# Patient Record
Sex: MALE | Race: WHITE
[De-identification: names, ages, dates, MRNs, and addresses within clinical notes are randomized per-mention and may not be internally consistent; named-entity substitution may affect disease eponyms.]

---

## 2020-09-02 ENCOUNTER — HOSPITAL ENCOUNTER (EMERGENCY)
Dept: HOSPITAL 74 - FER | Age: 30
Discharge: HOME | End: 2020-09-02
Payer: COMMERCIAL

## 2020-09-02 VITALS — HEART RATE: 71 BPM | TEMPERATURE: 98.7 F | SYSTOLIC BLOOD PRESSURE: 119 MMHG | DIASTOLIC BLOOD PRESSURE: 81 MMHG

## 2020-09-02 VITALS — BODY MASS INDEX: 27.9 KG/M2

## 2020-09-02 DIAGNOSIS — Z77.21: ICD-10-CM

## 2020-09-02 DIAGNOSIS — S63.614A: Primary | ICD-10-CM

## 2020-09-02 NOTE — PDOC
Post Exposure HPI





- General


Chief Complaint: Non EmpBld/Body Flud Exposure


Stated Complaint: BLOOD EXPOSURE TO RIGHT ARM AND RIGHT RINGFINGER I


Time Seen by Provider: 09/02/20 17:26


History Source: Patient


Exam Limitations: No Limitations





- History of Present Illness


Initial Comments: 





09/02/20 17:30


HPI


29 YOM with no sig medical history presenting with blood exposure to his skin. 

pt works as  for [employer] he was helping subdue a patient high 

on PCP. There was blood that got on his right forearm and right face, no breaks 

in the skin or wounds or injuries noted. pt cleaned off with water immediately. 

while subduing he also had right ring finger pain, worse with movement.


pt denies any other complaints.





 


Review of Systems 


MUSCULOSKELETAL: No joint pain and swelling. No muscle pain/arthralgias. +finger

pain.


Back: no back pain


SKIN: no redness or skin changes, no discharge, no rash. No wounds.


Hematologic: no easy bruising/bleeding. 


NEUROLOGIC: No weakness, numbness or tingling. 


Allergic/Immunologic: no allergies


All other systems reviewed and negative, or as documented in HPI. 








physical exam


General: NAD, well appearing


HEENT: NCAT, EOMI, PERRL. airway patent


Resp: no distress, speaking full sentences.


Vascular: 2+ DP and radial pulses symmetric and equal.


Back: no midline tenderness, no stepoffs, FROM


MSK: notable for soft compartments, Cap refill <2 sec. Proximal and distal 

strength 5/5,  strength 5/5 - equal and symmetric. FROM. Sensation grossly 

intact to light touch to median/radial/ulnar distribution. +right prox 

phalangeal ring finger TTP, but FROM, no swelling, no wounds and no deformity.


Neuro: alert, no focal neurologic deficits, gait stable.


Skin:  color normal color, warm and well perfused.  Cap refill <2 sec. no 

wounds, no lacerations or puncture wounds








09/02/20 17:33








Past History





- Medical History


Allergies/Adverse Reactions: 


                                    Allergies











Allergy/AdvReac Type Severity Reaction Status Date / Time


 


No Known Allergies Allergy   Unverified 09/02/20 17:18











Home Medications: 


Ambulatory Orders





Bupropion HCl [Wellbutrin Sr] 300 mg PO DAILY 09/02/20 








COPD: No


Other medical history: denies





- Immunization History


Immunization Up to Date: No





- Psycho-Social/Smoking History


Smoking History: Current every day smoker


Number of Cigarettes Smoked Daily: 15


Information on smoking cessation initiated: No





- Substance Abuse Hx (Audit-C & DAST Scrn)


How often the patient has a drink containing alcohol: 2-4 times / month


Number of drinks the patient has on a typical day: 1 or 2


Score: In Men: 4 or > Positive; In Women: 3 or > Positive: 2


Screen Result (Pos requires Nsg. Audit-10AR): Negative


In the last yr the pt used illegal drug/Rx for NonMed reason: No


Score:  Yes response is considered Positive: 0


Screen Result (Positive result requires Nsg. DAST-10): Negative





*Physical Exam





- Vital Signs


                                Last Vital Signs











Temp Pulse Resp BP Pulse Ox


 


 98.7 F   71   18   119/81   97 


 


 09/02/20 17:18  09/02/20 17:18  09/02/20 17:18  09/02/20 17:18  09/02/20 17:18














Medical Decision Making





- Medical Decision Making





09/02/20 17:32


Vital Signs











Temp Pulse Resp BP Pulse Ox


 


 98.7 F   71   18   119/81   97 


 


 09/02/20 17:18  09/02/20 17:18  09/02/20 17:18  09/02/20 17:18  09/02/20 17:18








vitals reviewed wnl.


reassuring





no wounds on secondary survey. no other complaints. no pain. no body 

injury/traumatic injuries


vaccines up to date


very low risk of exposure to blood borne pathogens such as hep b, c and hiv.


discussed with the patient, reassurance.





likely finger contusion, no xray imaging indicated, doubt fracture clinically 

and NVI





DC stable condition


09/02/20 17:33








Discharge





- Discharge Information


Problems reviewed: Yes


Clinical Impression/Diagnosis: 


 Exposure to blood or body fluid





Sprain of right ring finger


Qualifiers:


 Encounter type: initial encounter Sprain of finger site: unspecified site 

Qualified Code(s): S63.614A - Unspecified sprain of right ring finger, initial 

encounter





Condition: Stable


Disposition: HOME





- Admission


No





- Follow up/Referral


Referrals: 


Lelo Carlson MD [Primary Care Provider] - 





- Patient Discharge Instructions


Patient Printed Discharge Instructions:  DI for Finger Sprain, How to Handle 

Body Fluid Exposure -- Non-Healthcare Worker (At Home, Caregi





- Post Discharge Activity


Work/Back to School Note:  Back to Work

## 2021-02-23 ENCOUNTER — HOSPITAL ENCOUNTER (EMERGENCY)
Dept: HOSPITAL 74 - FER | Age: 31
Discharge: HOME | End: 2021-02-23
Payer: COMMERCIAL

## 2021-02-23 VITALS — DIASTOLIC BLOOD PRESSURE: 77 MMHG | SYSTOLIC BLOOD PRESSURE: 114 MMHG | TEMPERATURE: 98 F | HEART RATE: 72 BPM

## 2021-02-23 VITALS — BODY MASS INDEX: 28.7 KG/M2

## 2021-02-23 DIAGNOSIS — Z77.21: Primary | ICD-10-CM

## 2021-03-13 ENCOUNTER — HOSPITAL ENCOUNTER (EMERGENCY)
Dept: HOSPITAL 74 - FER | Age: 31
Discharge: HOME | End: 2021-03-13
Payer: COMMERCIAL

## 2021-03-13 VITALS — DIASTOLIC BLOOD PRESSURE: 84 MMHG | TEMPERATURE: 98.9 F | SYSTOLIC BLOOD PRESSURE: 121 MMHG | HEART RATE: 78 BPM

## 2021-03-13 VITALS — BODY MASS INDEX: 27.2 KG/M2

## 2021-03-13 DIAGNOSIS — H93.13: Primary | ICD-10-CM

## 2021-03-13 DIAGNOSIS — Z00.00: ICD-10-CM

## 2022-06-06 ENCOUNTER — HOSPITAL ENCOUNTER (EMERGENCY)
Dept: HOSPITAL 74 - FER | Age: 32
Discharge: HOME | End: 2022-06-06
Payer: COMMERCIAL

## 2022-06-06 VITALS — TEMPERATURE: 99 F | HEART RATE: 110 BPM | DIASTOLIC BLOOD PRESSURE: 56 MMHG | SYSTOLIC BLOOD PRESSURE: 118 MMHG

## 2022-06-06 VITALS — BODY MASS INDEX: 28.7 KG/M2

## 2022-06-06 DIAGNOSIS — S09.90XA: Primary | ICD-10-CM

## 2022-06-06 DIAGNOSIS — M54.2: ICD-10-CM
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
## 2022-12-27 ENCOUNTER — HOSPITAL ENCOUNTER (EMERGENCY)
Dept: HOSPITAL 74 - FER | Age: 32
Discharge: HOME | End: 2022-12-27
Payer: COMMERCIAL

## 2022-12-27 VITALS
SYSTOLIC BLOOD PRESSURE: 120 MMHG | RESPIRATION RATE: 18 BRPM | TEMPERATURE: 97.8 F | HEART RATE: 77 BPM | DIASTOLIC BLOOD PRESSURE: 80 MMHG

## 2022-12-27 VITALS — BODY MASS INDEX: 27.9 KG/M2
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
## 2022-12-27 DIAGNOSIS — S60.221A: Primary | ICD-10-CM

## 2022-12-27 DIAGNOSIS — M25.561: ICD-10-CM

## 2023-01-11 ENCOUNTER — HOSPITAL ENCOUNTER (EMERGENCY)
Dept: HOSPITAL 74 - FER | Age: 33
Discharge: HOME | End: 2023-01-11
Payer: COMMERCIAL

## 2023-01-11 VITALS
RESPIRATION RATE: 16 BRPM | HEART RATE: 87 BPM | TEMPERATURE: 99.6 F | SYSTOLIC BLOOD PRESSURE: 117 MMHG | DIASTOLIC BLOOD PRESSURE: 84 MMHG

## 2023-01-11 VITALS — BODY MASS INDEX: 27.9 KG/M2

## 2023-01-11 DIAGNOSIS — S69.92XA: Primary | ICD-10-CM
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
## 2023-01-13 ENCOUNTER — HOSPITAL ENCOUNTER (EMERGENCY)
Dept: HOSPITAL 74 - FER | Age: 33
Discharge: HOME | End: 2023-01-13
Payer: COMMERCIAL

## 2023-01-13 VITALS
SYSTOLIC BLOOD PRESSURE: 136 MMHG | RESPIRATION RATE: 18 BRPM | HEART RATE: 81 BPM | TEMPERATURE: 98.3 F | DIASTOLIC BLOOD PRESSURE: 71 MMHG

## 2023-01-13 VITALS — BODY MASS INDEX: 27.9 KG/M2
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
## 2023-01-13 DIAGNOSIS — S69.91XA: Primary | ICD-10-CM

## 2023-04-16 ENCOUNTER — HOSPITAL ENCOUNTER (EMERGENCY)
Dept: HOSPITAL 74 - FER | Age: 33
Discharge: HOME | End: 2023-04-16
Payer: COMMERCIAL

## 2023-04-16 VITALS
SYSTOLIC BLOOD PRESSURE: 127 MMHG | DIASTOLIC BLOOD PRESSURE: 67 MMHG | HEART RATE: 65 BPM | TEMPERATURE: 98.5 F | RESPIRATION RATE: 20 BRPM

## 2023-04-16 VITALS — BODY MASS INDEX: 27.9 KG/M2
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
## 2023-04-16 DIAGNOSIS — S50.02XA: Primary | ICD-10-CM
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
## 2023-05-14 ENCOUNTER — HOSPITAL ENCOUNTER (EMERGENCY)
Dept: HOSPITAL 74 - JER | Age: 33
Discharge: HOME | End: 2023-05-14
Payer: COMMERCIAL

## 2023-05-14 VITALS
TEMPERATURE: 97.7 F | DIASTOLIC BLOOD PRESSURE: 78 MMHG | RESPIRATION RATE: 18 BRPM | HEART RATE: 63 BPM | SYSTOLIC BLOOD PRESSURE: 122 MMHG

## 2023-05-14 VITALS — BODY MASS INDEX: 28.7 KG/M2

## 2023-05-14 DIAGNOSIS — Z77.21: Primary | ICD-10-CM

## 2023-05-14 LAB
ALBUMIN SERPL-MCNC: 3.8 G/DL (ref 3.4–5)
ALP SERPL-CCNC: 47 U/L (ref 45–117)
ALT SERPL-CCNC: 31 U/L (ref 13–61)
ANION GAP SERPL CALC-SCNC: 5 MMOL/L (ref 8–16)
AST SERPL-CCNC: 17 U/L (ref 15–37)
BASOPHILS # BLD: 0.3 % (ref 0–2)
BILIRUB SERPL-MCNC: 0.4 MG/DL (ref 0.2–1)
BUN SERPL-MCNC: 24.3 MG/DL (ref 7–18)
CALCIUM SERPL-MCNC: 9.3 MG/DL (ref 8.5–10.1)
CHLORIDE SERPL-SCNC: 109 MMOL/L (ref 98–107)
CO2 SERPL-SCNC: 28 MMOL/L (ref 21–32)
CREAT SERPL-MCNC: 0.9 MG/DL (ref 0.55–1.3)
DEPRECATED RDW RBC AUTO: 13.7 % (ref 11.9–15.9)
EOSINOPHIL # BLD: 3 % (ref 0–4.5)
GLUCOSE SERPL-MCNC: 108 MG/DL (ref 74–106)
HCT VFR BLD CALC: 39.8 % (ref 35.4–49)
HGB BLD-MCNC: 13.7 GM/DL (ref 11.7–16.9)
HIV 1+2 AB+HIV1 P24 AG SERPL QL IA: NEGATIVE
LYMPHOCYTES # BLD: 33.1 % (ref 8–40)
MCH RBC QN AUTO: 30.8 PG (ref 25.7–33.7)
MCHC RBC AUTO-ENTMCNC: 34.5 G/DL (ref 32–35.9)
MCV RBC: 89.3 FL (ref 80–96)
MONOCYTES # BLD AUTO: 10.3 % (ref 3.8–10.2)
NEUTROPHILS # BLD: 53.3 % (ref 42.8–82.8)
PLATELET # BLD AUTO: 163 10^3/UL (ref 134–434)
PMV BLD: 9.5 FL (ref 7.5–11.1)
POTASSIUM SERPLBLD-SCNC: 4 MMOL/L (ref 3.5–5.1)
PROT SERPL-MCNC: 6.8 G/DL (ref 6.4–8.2)
RBC # BLD AUTO: 4.45 M/MM3 (ref 4–5.6)
SODIUM SERPL-SCNC: 141 MMOL/L (ref 136–145)
WBC # BLD AUTO: 8.9 K/MM3 (ref 4–10)

## 2023-06-04 ENCOUNTER — HOSPITAL ENCOUNTER (EMERGENCY)
Dept: HOSPITAL 74 - FER | Age: 33
Discharge: HOME | End: 2023-06-04
Payer: COMMERCIAL

## 2023-06-04 VITALS
HEART RATE: 81 BPM | SYSTOLIC BLOOD PRESSURE: 142 MMHG | RESPIRATION RATE: 18 BRPM | DIASTOLIC BLOOD PRESSURE: 78 MMHG | TEMPERATURE: 98.6 F

## 2023-06-04 VITALS — BODY MASS INDEX: 28.7 KG/M2
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
## 2023-06-04 DIAGNOSIS — M54.2: Primary | ICD-10-CM

## 2023-06-04 DIAGNOSIS — S50.311A: ICD-10-CM

## 2023-06-04 DIAGNOSIS — S10.91XA: ICD-10-CM

## 2023-10-27 PROBLEM — Z00.00 ENCOUNTER FOR PREVENTIVE HEALTH EXAMINATION: Status: ACTIVE | Noted: 2023-10-27

## 2024-01-03 ENCOUNTER — APPOINTMENT (OUTPATIENT)
Dept: ENDOCRINOLOGY | Facility: CLINIC | Age: 34
End: 2024-01-03

## 2024-01-30 ENCOUNTER — APPOINTMENT (OUTPATIENT)
Dept: ORTHOPEDIC SURGERY | Facility: CLINIC | Age: 34
End: 2024-01-30
Payer: COMMERCIAL

## 2024-01-30 DIAGNOSIS — Z78.9 OTHER SPECIFIED HEALTH STATUS: ICD-10-CM

## 2024-01-30 PROCEDURE — 99204 OFFICE O/P NEW MOD 45 MIN: CPT

## 2024-01-30 RX ORDER — TRAMADOL HYDROCHLORIDE 50 MG/1
50 TABLET, COATED ORAL
Qty: 60 | Refills: 0 | Status: ACTIVE | COMMUNITY
Start: 2024-01-30 | End: 1900-01-01

## 2024-01-30 RX ORDER — DEXTROAMPHETAMINE SACCHARATE, AMPHETAMINE ASPARTATE, DEXTROAMPHETAMINE SULFATE, AND AMPHETAMINE SULFATE 5; 5; 5; 5 MG/1; MG/1; MG/1; MG/1
20 TABLET ORAL
Refills: 0 | Status: ACTIVE | COMMUNITY

## 2024-02-02 RX ORDER — BUSPIRONE HYDROCHLORIDE 10 MG/1
10 TABLET ORAL
Qty: 60 | Refills: 0 | Status: ACTIVE | COMMUNITY
Start: 2024-01-25

## 2024-02-02 RX ORDER — TIRZEPATIDE 2.5 MG/.5ML
2.5 INJECTION, SOLUTION SUBCUTANEOUS
Qty: 2 | Refills: 0 | Status: ACTIVE | COMMUNITY
Start: 2024-01-22

## 2024-02-02 RX ORDER — DEXTROAMPHETAMINE SACCHARATE, AMPHETAMINE ASPARTATE MONOHYDRATE, DEXTROAMPHETAMINE SULFATE AND AMPHETAMINE SULFATE 5; 5; 5; 5 MG/1; MG/1; MG/1; MG/1
20 CAPSULE, EXTENDED RELEASE ORAL
Qty: 20 | Refills: 0 | Status: ACTIVE | COMMUNITY
Start: 2024-01-25

## 2024-02-02 RX ORDER — HYDROXYZINE HYDROCHLORIDE 10 MG/1
10 TABLET ORAL
Qty: 60 | Refills: 0 | Status: ACTIVE | COMMUNITY
Start: 2024-01-25

## 2024-02-02 RX ORDER — DEXTROAMPHETAMINE SACCHARATE, AMPHETAMINE ASPARTATE, DEXTROAMPHETAMINE SULFATE AND AMPHETAMINE SULFATE 2.5; 2.5; 2.5; 2.5 MG/1; MG/1; MG/1; MG/1
10 TABLET ORAL
Qty: 28 | Refills: 0 | Status: ACTIVE | COMMUNITY
Start: 2024-01-25

## 2024-02-02 NOTE — PLAN
[TextEntry] : I recommended a [facility] home cervical traction unit, tramadol, and follow up in six weeks time.

## 2024-02-02 NOTE — HISTORY OF PRESENT ILLNESS
[de-identified] : Serge is here for follow up regarding his persistent, chronic neck pain. He has not garnered significant improvement with acupuncture medication nor physiotherapy. He has been getting improvement with chiropractic treatments.

## 2024-02-02 NOTE — END OF VISIT
[FreeTextEntry3] : I Julia Vega, acting as scribe, attest that this documentation has been prepared under the direction and in the presence of Provider Nickolas Orosco MD.   I was physically present during the service to the patient and/or personally examined the patient and I was directly involved in the management plan and recommendations of the care provided to the patient.   I Dr. Orosco, reviewed the history, the physical exam, and plan as documented by the PA. The documentation recorded by the PA, in my presence, accurately reflects the service I personally performed, and the decisions made by me with my edits as appropriate.  Nickolas Orosco MD

## 2024-03-12 ENCOUNTER — APPOINTMENT (OUTPATIENT)
Dept: ORTHOPEDIC SURGERY | Facility: CLINIC | Age: 34
End: 2024-03-12

## 2024-05-14 ENCOUNTER — APPOINTMENT (OUTPATIENT)
Dept: ORTHOPEDIC SURGERY | Facility: CLINIC | Age: 34
End: 2024-05-14
Payer: COMMERCIAL

## 2024-05-14 DIAGNOSIS — M54.2 CERVICALGIA: ICD-10-CM

## 2024-05-14 PROCEDURE — 99214 OFFICE O/P EST MOD 30 MIN: CPT

## 2024-05-15 PROBLEM — M54.2 NECK PAIN: Status: ACTIVE | Noted: 2024-01-30

## 2024-05-15 NOTE — PLAN
[TextEntry] : Repeat cervical MRI and follow-up with Dr. Orosco once the imaging is completed.  Call the office with any questions, concerns, or worsening symptoms. Plan was discussed with patient whom expressed understanding and agreed with plan.

## 2024-05-15 NOTE — ASSESSMENT
[FreeTextEntry1] : Patient presents for follow-up regarding neck pain. Pain is persistent. He reports only mild temporary relief with current treatment. He would like to know prognosis and next steps.

## 2024-05-15 NOTE — HISTORY OF PRESENT ILLNESS
[de-identified] : Serge Tucker presents for follow-up regarding his neck pain. He reports pain is 5/10 today, it's persistent. He reports minimal relief with tramadol and he does not want to continue taking it.  He reports relief with chiropractor, traction, and home stretching. He uses a manual traction device with mild relief. He has concerns regarding prognosis and next steps.

## 2024-05-15 NOTE — END OF VISIT
[FreeTextEntry3] :  Documented by Karen Lemon acting as a scribe for Dr. Nickolas Orosco.    All medical record entries made by the Scribe were at my, Dr. Nickolas Orosco. direction and personally dictated by me on . I have reviewed the chart and agree that the record accurately reflects my personal performance of the history, physical exam, assessment and plan. I have also personally directed, reviewed, and agreed with the chart.

## 2024-05-15 NOTE — PHYSICAL EXAM
[Normal] : Alert and in no acute distress [de-identified] : Gait is normal  Cervical Spine: Full ROM x 6 without pain. -ttp throughout. 5/5 throughout b/l upper extremities. SILT throughout.

## 2024-07-30 ENCOUNTER — APPOINTMENT (OUTPATIENT)
Dept: ORTHOPEDIC SURGERY | Facility: CLINIC | Age: 34
End: 2024-07-30

## 2024-07-30 VITALS — HEIGHT: 70 IN | BODY MASS INDEX: 27.2 KG/M2 | WEIGHT: 190 LBS

## 2024-07-30 DIAGNOSIS — M54.2 CERVICALGIA: ICD-10-CM

## 2024-07-30 PROCEDURE — 99212 OFFICE O/P EST SF 10 MIN: CPT

## 2024-07-31 NOTE — PHYSICAL EXAM
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 Consistent with his reported neck pain.

## 2024-07-31 NOTE — HISTORY OF PRESENT ILLNESS
[de-identified] : OTN REAL is a 33 year male presents with ongoing neck pain that remains unchanged. He recently underwent an MRI.

## 2024-07-31 NOTE — END OF VISIT
[FreeTextEntry3] : I, Medina Montana, acted as a scribe on behalf of Dr. Orosoc 07/30/2024.  All medical record entries made by Medina Montana (scribe) were at my, Dr. Nickolas Orosco, direction and personally dictated by me on 07/30/2024. I have reviewed the chart and agree that the record accurately reflects my personal performance of the history, physical exam, assessment, and plan. I have also personally directed, reviewed, and agreed with the chart.

## 2024-07-31 NOTE — END OF VISIT
[FreeTextEntry3] : I, Medina Montana, acted as a scribe on behalf of Dr. Orosco 07/30/2024.  All medical record entries made by Medina Montana (scribe) were at my, Dr. Nickolas Orosco, direction and personally dictated by me on 07/30/2024. I have reviewed the chart and agree that the record accurately reflects my personal performance of the history, physical exam, assessment, and plan. I have also personally directed, reviewed, and agreed with the chart.

## 2024-07-31 NOTE — HISTORY OF PRESENT ILLNESS
[de-identified] : TON REAL is a 33 year male presents with ongoing neck pain that remains unchanged. He recently underwent an MRI.

## 2024-07-31 NOTE — PLAN
[TextEntry] : I recommended a TLESI, home cervical traction unit, Wan unit, and a follow up visit in two months. If symptoms are recalcitrant, we discussed options going forward, including but not limited to total disc replacement. He verbalized understanding and wish to proceed with the aforementioned conservative treatment plan. He'll follow up with us in two months.

## 2024-07-31 NOTE — PHYSICAL EXAM
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 Consistent with his reported neck pain.